# Patient Record
Sex: MALE | Race: WHITE | Employment: OTHER | URBAN - METROPOLITAN AREA
[De-identification: names, ages, dates, MRNs, and addresses within clinical notes are randomized per-mention and may not be internally consistent; named-entity substitution may affect disease eponyms.]

---

## 2017-05-02 ENCOUNTER — HOSPITAL ENCOUNTER (EMERGENCY)
Age: 62
Discharge: HOME | End: 2017-05-02
Attending: EMERGENCY MEDICINE
Payer: COMMERCIAL

## 2017-05-02 VITALS
RESPIRATION RATE: 20 BRPM | SYSTOLIC BLOOD PRESSURE: 139 MMHG | BODY MASS INDEX: 26.6 KG/M2 | HEART RATE: 75 BPM | HEIGHT: 71 IN | TEMPERATURE: 98 F | OXYGEN SATURATION: 100 % | DIASTOLIC BLOOD PRESSURE: 80 MMHG | WEIGHT: 190 LBS

## 2017-05-02 DIAGNOSIS — H60.501 ACUTE OTITIS EXTERNA OF RIGHT EAR, UNSPECIFIED TYPE: Primary | ICD-10-CM

## 2017-05-02 PROCEDURE — 99283 EMERGENCY DEPT VISIT LOW MDM: CPT

## 2017-05-02 RX ORDER — IBUPROFEN 600 MG/1
TABLET ORAL
Status: COMPLETED
Start: 2017-05-02 | End: 2017-05-02

## 2017-05-02 RX ORDER — IBUPROFEN 600 MG/1
600 TABLET ORAL ONCE
Status: COMPLETED | OUTPATIENT
Start: 2017-05-02 | End: 2017-05-02

## 2017-05-02 RX ORDER — ACETAMINOPHEN 500 MG
1000 TABLET ORAL ONCE
Status: DISCONTINUED | OUTPATIENT
Start: 2017-05-02 | End: 2017-05-02

## 2017-05-02 RX ORDER — AMOXICILLIN AND CLAVULANATE POTASSIUM 875; 125 MG/1; MG/1
1 TABLET, FILM COATED ORAL 2 TIMES DAILY
Qty: 14 TABLET | Refills: 0 | Status: SHIPPED | OUTPATIENT
Start: 2017-05-02 | End: 2017-05-09

## 2017-05-03 NOTE — ED PROVIDER NOTES
Patient Seen in: THE [facility] Emergency Department In [place]    History   Patient presents with:  Ear Problem Pain (neurosensory)    Stated Complaint: right ear pain     HPI    This is a very pleasant 20-year-old male with past medical history of hyperlipi TAKE ONE TABLET BY MOUTH NIGHTLY.   tamsulosin HCl 0.4 MG Oral Cap,  Take by mouth daily.        Family History   Problem Relation Age of Onset   • Cancer Father          Smoking Status: Current Every Day Smoker        Packs/Day: 1.00  Years:           Type Augmentin. He should be rechecked in 2 days by ENT or return here or his primary care physician. Ibuprofen and/or Norco for pain. If he develops fever, increased pain or drainage from his ear he should be rechecked immediately.   This was communicated to

## 2017-05-04 NOTE — ED NOTES
CALLED PT REGARDING [facility], PT WIFE ANSWERED AND STATES SHE IS TAKING PT TO ENT AT 0027 AND THE SWELLING HAS GOTTEN WORSE.   PT WIFE ENCOURAGED TO KEEP APPOINTMENT

## 2017-09-19 PROBLEM — M51.360 LUMBAR DISCOGENIC PAIN SYNDROME: Status: ACTIVE | Noted: 2017-09-19

## 2017-09-19 PROBLEM — M51.26 LUMBAR DISCOGENIC PAIN SYNDROME: Status: ACTIVE | Noted: 2017-09-19

## 2018-01-16 PROCEDURE — 87086 URINE CULTURE/COLONY COUNT: CPT | Performed by: EMERGENCY MEDICINE

## 2018-01-22 PROBLEM — E11.9 ENCOUNTER FOR DIABETIC FOOT EXAM (HCC): Status: ACTIVE | Noted: 2018-01-22

## 2018-01-22 PROBLEM — E11.9 DIABETIC EYE EXAM (HCC): Status: ACTIVE | Noted: 2018-01-22

## 2018-01-22 PROBLEM — Z01.00 DIABETIC EYE EXAM (HCC): Status: ACTIVE | Noted: 2018-01-22

## 2018-01-22 PROBLEM — K21.9 GASTROESOPHAGEAL REFLUX DISEASE WITHOUT ESOPHAGITIS: Status: ACTIVE | Noted: 2018-01-22

## 2018-01-22 PROBLEM — R73.01 IMPAIRED FASTING BLOOD SUGAR: Status: ACTIVE | Noted: 2018-01-22

## 2018-04-09 PROBLEM — N40.1 BENIGN PROSTATIC HYPERPLASIA WITH LOWER URINARY TRACT SYMPTOMS, SYMPTOM DETAILS UNSPECIFIED: Status: ACTIVE | Noted: 2018-04-09

## 2018-04-11 PROCEDURE — 82043 UR ALBUMIN QUANTITATIVE: CPT | Performed by: INTERNAL MEDICINE

## 2018-04-11 PROCEDURE — 36415 COLL VENOUS BLD VENIPUNCTURE: CPT | Performed by: INTERNAL MEDICINE

## 2018-04-11 PROCEDURE — 82570 ASSAY OF URINE CREATININE: CPT | Performed by: INTERNAL MEDICINE

## 2018-04-11 PROCEDURE — 81001 URINALYSIS AUTO W/SCOPE: CPT | Performed by: INTERNAL MEDICINE

## 2018-04-30 PROBLEM — R07.2 CHEST PAIN, PRECORDIAL: Status: ACTIVE | Noted: 2018-04-30

## 2018-04-30 PROBLEM — I71.2 ASCENDING AORTIC ANEURYSM (HCC): Status: ACTIVE | Noted: 2018-04-30

## 2018-04-30 PROBLEM — I71.21 ASCENDING AORTIC ANEURYSM (HCC): Status: ACTIVE | Noted: 2018-04-30

## 2018-04-30 PROBLEM — I71.2 ASCENDING AORTIC ANEURYSM: Status: ACTIVE | Noted: 2018-04-30

## 2018-04-30 PROBLEM — E78.00 PURE HYPERCHOLESTEROLEMIA: Status: ACTIVE | Noted: 2018-04-30

## 2018-04-30 PROBLEM — I71.21 ASCENDING AORTIC ANEURYSM: Status: ACTIVE | Noted: 2018-04-30

## 2018-05-03 PROBLEM — K64.9 HEMORRHOIDS, UNSPECIFIED HEMORRHOID TYPE: Status: ACTIVE | Noted: 2018-05-03

## 2018-05-03 PROBLEM — E11.65 TYPE 2 DIABETES MELLITUS WITH HYPERGLYCEMIA, UNSPECIFIED WHETHER LONG TERM INSULIN USE (HCC): Status: ACTIVE | Noted: 2018-05-03

## 2018-05-03 PROBLEM — I72.9 ANEURYSM (HCC): Status: ACTIVE | Noted: 2018-05-03

## 2018-05-03 PROBLEM — I72.9 ANEURYSM: Status: ACTIVE | Noted: 2018-05-03

## 2018-05-03 PROBLEM — J18.9 PNEUMONIA OF RIGHT LOWER LOBE DUE TO INFECTIOUS ORGANISM: Status: ACTIVE | Noted: 2018-05-03

## 2018-05-10 PROBLEM — R07.2 CHEST PAIN, PRECORDIAL: Status: RESOLVED | Noted: 2018-04-30 | Resolved: 2018-05-10

## 2018-05-10 PROBLEM — J18.9 PNEUMONIA OF RIGHT LOWER LOBE DUE TO INFECTIOUS ORGANISM: Status: RESOLVED | Noted: 2018-05-03 | Resolved: 2018-05-10

## 2018-05-30 PROBLEM — N39.0 ACUTE UTI: Status: ACTIVE | Noted: 2018-05-30

## 2018-05-30 PROBLEM — H93.91: Status: ACTIVE | Noted: 2018-05-30

## 2018-05-30 PROBLEM — R39.15 URINARY URGENCY: Status: ACTIVE | Noted: 2018-05-30

## 2018-05-30 PROBLEM — R35.0 URINARY FREQUENCY: Status: ACTIVE | Noted: 2018-05-30

## 2018-05-30 PROBLEM — H61.21 IMPACTED CERUMEN OF RIGHT EAR: Status: ACTIVE | Noted: 2018-05-30

## 2018-05-30 PROBLEM — R30.0 BURNING WITH URINATION: Status: ACTIVE | Noted: 2018-05-30

## 2018-12-17 PROBLEM — R35.0 URINARY FREQUENCY: Status: RESOLVED | Noted: 2018-05-30 | Resolved: 2018-12-17

## 2018-12-17 PROBLEM — E78.00 PURE HYPERCHOLESTEROLEMIA: Status: RESOLVED | Noted: 2018-04-30 | Resolved: 2018-12-17

## 2018-12-17 PROBLEM — R73.01 IMPAIRED FASTING BLOOD SUGAR: Status: RESOLVED | Noted: 2018-01-22 | Resolved: 2018-12-17

## 2018-12-17 PROBLEM — H61.21 IMPACTED CERUMEN OF RIGHT EAR: Status: RESOLVED | Noted: 2018-05-30 | Resolved: 2018-12-17

## 2018-12-17 PROBLEM — I72.9 ANEURYSM: Status: RESOLVED | Noted: 2018-05-03 | Resolved: 2018-12-17

## 2018-12-17 PROBLEM — E11.65 TYPE 2 DIABETES MELLITUS WITH HYPERGLYCEMIA, UNSPECIFIED WHETHER LONG TERM INSULIN USE (HCC): Status: RESOLVED | Noted: 2018-05-03 | Resolved: 2018-12-17

## 2018-12-17 PROBLEM — I72.9 ANEURYSM (HCC): Status: RESOLVED | Noted: 2018-05-03 | Resolved: 2018-12-17

## 2018-12-17 PROBLEM — N39.0 ACUTE UTI: Status: RESOLVED | Noted: 2018-05-30 | Resolved: 2018-12-17

## 2018-12-17 PROBLEM — R93.89 ABNORMAL CHEST X-RAY: Status: ACTIVE | Noted: 2018-12-17

## 2018-12-17 PROBLEM — R30.0 BURNING WITH URINATION: Status: RESOLVED | Noted: 2018-05-30 | Resolved: 2018-12-17

## 2018-12-17 PROBLEM — H93.91: Status: RESOLVED | Noted: 2018-05-30 | Resolved: 2018-12-17

## 2018-12-17 PROBLEM — R39.15 URINARY URGENCY: Status: RESOLVED | Noted: 2018-05-30 | Resolved: 2018-12-17

## 2019-01-03 PROCEDURE — 86256 FLUORESCENT ANTIBODY TITER: CPT | Performed by: NURSE PRACTITIONER

## 2019-01-03 PROCEDURE — 82784 ASSAY IGA/IGD/IGG/IGM EACH: CPT | Performed by: NURSE PRACTITIONER

## 2019-01-03 PROCEDURE — 36415 COLL VENOUS BLD VENIPUNCTURE: CPT | Performed by: NURSE PRACTITIONER

## 2019-01-03 PROCEDURE — 83516 IMMUNOASSAY NONANTIBODY: CPT | Performed by: NURSE PRACTITIONER

## 2019-01-17 PROBLEM — K64.9 HEMORRHOIDS, UNSPECIFIED HEMORRHOID TYPE: Status: RESOLVED | Noted: 2018-05-03 | Resolved: 2019-01-17

## 2019-01-17 PROBLEM — I71.2 THORACIC AORTIC ANEURYSM WITHOUT RUPTURE (HCC): Status: ACTIVE | Noted: 2019-01-17

## 2019-01-17 PROBLEM — I71.2 THORACIC AORTIC ANEURYSM WITHOUT RUPTURE: Status: ACTIVE | Noted: 2019-01-17

## 2019-01-17 PROBLEM — I71.20 THORACIC AORTIC ANEURYSM WITHOUT RUPTURE (HCC): Status: ACTIVE | Noted: 2019-01-17

## 2019-01-17 PROBLEM — M51.360 LUMBAR DISCOGENIC PAIN SYNDROME: Status: RESOLVED | Noted: 2017-09-19 | Resolved: 2019-01-17

## 2019-01-17 PROBLEM — R93.89 ABNORMAL CHEST X-RAY: Status: RESOLVED | Noted: 2018-12-17 | Resolved: 2019-01-17

## 2019-01-17 PROBLEM — I71.20 THORACIC AORTIC ANEURYSM WITHOUT RUPTURE: Status: ACTIVE | Noted: 2019-01-17

## 2019-01-17 PROBLEM — M51.26 LUMBAR DISCOGENIC PAIN SYNDROME: Status: RESOLVED | Noted: 2017-09-19 | Resolved: 2019-01-17

## 2019-02-11 PROCEDURE — 81001 URINALYSIS AUTO W/SCOPE: CPT | Performed by: INTERNAL MEDICINE

## 2019-02-11 PROCEDURE — 83883 ASSAY NEPHELOMETRY NOT SPEC: CPT | Performed by: INTERNAL MEDICINE

## 2019-02-11 PROCEDURE — 82570 ASSAY OF URINE CREATININE: CPT | Performed by: INTERNAL MEDICINE

## 2019-02-11 PROCEDURE — 84156 ASSAY OF PROTEIN URINE: CPT | Performed by: INTERNAL MEDICINE

## 2019-02-11 PROCEDURE — 86335 IMMUNFIX E-PHORSIS/URINE/CSF: CPT | Performed by: INTERNAL MEDICINE

## 2019-02-11 PROCEDURE — 82043 UR ALBUMIN QUANTITATIVE: CPT | Performed by: INTERNAL MEDICINE

## 2019-03-19 PROCEDURE — 88305 TISSUE EXAM BY PATHOLOGIST: CPT | Performed by: INTERNAL MEDICINE

## 2019-08-02 ENCOUNTER — OFFICE VISIT (OUTPATIENT)
Dept: FAMILY MEDICINE CLINIC | Facility: CLINIC | Age: 64
End: 2019-08-02
Payer: COMMERCIAL

## 2019-08-02 VITALS
SYSTOLIC BLOOD PRESSURE: 122 MMHG | OXYGEN SATURATION: 98 % | TEMPERATURE: 98 F | WEIGHT: 186 LBS | RESPIRATION RATE: 20 BRPM | HEART RATE: 66 BPM | DIASTOLIC BLOOD PRESSURE: 70 MMHG | BODY MASS INDEX: 26.04 KG/M2 | HEIGHT: 71 IN

## 2019-08-02 DIAGNOSIS — L25.8 CONTACT DERMATITIS DUE TO OTHER AGENT, UNSPECIFIED CONTACT DERMATITIS TYPE: Primary | ICD-10-CM

## 2019-08-02 PROCEDURE — 99201 OFFICE/OUTPT VISIT,NEW,LEVL I: CPT | Performed by: NURSE PRACTITIONER

## 2019-08-02 RX ORDER — BETAMETHASONE DIPROPIONATE 0.5 MG/G
1 CREAM TOPICAL 2 TIMES DAILY
Qty: 45 G | Status: CANCELLED | OUTPATIENT
Start: 2019-08-02 | End: 2019-08-09

## 2019-08-02 NOTE — PROGRESS NOTES
CHIEF COMPLAINT:   Patient presents with:  Lump: Bilateral arm pits bumps, itchy, X 4 days          HPI:    Katalina Warren is a 61year old male who presents for evaluation of a rash. Per patient rash started in the past 4 days.  Rash has been worsening management contract agreement 12/14/2016      Past Surgical History:   Procedure Laterality Date   • CHOLECYSTECTOMY     • COLONOSCOPY  02/26/2018    Aden Khan   • NEPHRECTOMY Left 2015   • OTHER SURGICAL HISTORY      green light laser procedure cream at home (betamethasone) that was used in the past and does not need a refill. Advised to use same cream. Meds as listed below. Comfort measures as described in Patient Instructions. Skin care discussed with patient.      Meds & Refills for this Visi

## 2019-08-02 NOTE — PATIENT INSTRUCTIONS
-can try over the counter allergy medication such as claritin or zyrtec  -apply steroid cream twice daily for up to 1 week  -follow up if new or worsening symptoms over next 3-4 days  -watch for signs and symptoms of infection such as fever, worsening in r leathery  · Swollen  · Warm  The blisters may ooze fluid and form crusts. Treatment for contact dermatitis  Treatment is done to help relieve itching and reduce inflammation. The rash should go away in a few days to a few weeks.  Treatments include:  ·  follow your healthcare professional's instructions.

## 2020-11-28 ENCOUNTER — APPOINTMENT (OUTPATIENT)
Dept: CT IMAGING | Age: 65
End: 2020-11-28
Attending: EMERGENCY MEDICINE
Payer: MEDICARE

## 2020-11-28 ENCOUNTER — HOSPITAL ENCOUNTER (EMERGENCY)
Age: 65
Discharge: HOME | End: 2020-11-28
Attending: EMERGENCY MEDICINE
Payer: MEDICARE

## 2020-11-28 VITALS
RESPIRATION RATE: 18 BRPM | WEIGHT: 182 LBS | HEIGHT: 71 IN | BODY MASS INDEX: 25.48 KG/M2 | HEART RATE: 61 BPM | OXYGEN SATURATION: 99 % | SYSTOLIC BLOOD PRESSURE: 123 MMHG | DIASTOLIC BLOOD PRESSURE: 68 MMHG | TEMPERATURE: 98 F

## 2020-11-28 DIAGNOSIS — R10.9 ABDOMINAL PAIN OF UNKNOWN ETIOLOGY: Primary | ICD-10-CM

## 2020-11-28 PROCEDURE — 74176 CT ABD & PELVIS W/O CONTRAST: CPT | Performed by: EMERGENCY MEDICINE

## 2020-11-28 PROCEDURE — 99284 EMERGENCY DEPT VISIT MOD MDM: CPT

## 2020-11-28 PROCEDURE — 81003 URINALYSIS AUTO W/O SCOPE: CPT | Performed by: EMERGENCY MEDICINE

## 2020-11-28 PROCEDURE — 36415 COLL VENOUS BLD VENIPUNCTURE: CPT

## 2020-11-28 PROCEDURE — 83690 ASSAY OF LIPASE: CPT | Performed by: EMERGENCY MEDICINE

## 2020-11-28 PROCEDURE — 85025 COMPLETE CBC W/AUTO DIFF WBC: CPT | Performed by: EMERGENCY MEDICINE

## 2020-11-28 PROCEDURE — 80053 COMPREHEN METABOLIC PANEL: CPT | Performed by: EMERGENCY MEDICINE

## 2020-11-28 RX ORDER — DICYCLOMINE HCL 20 MG
20 TABLET ORAL 4 TIMES DAILY PRN
Qty: 30 TABLET | Refills: 0 | Status: SHIPPED | OUTPATIENT
Start: 2020-11-28 | End: 2020-12-07 | Stop reason: ALTCHOICE

## 2020-11-28 NOTE — ED INITIAL ASSESSMENT (HPI)
Pt c/o abdominal pain and mid left back pain that started 4 days agio. Pt states he had mild constipation with the pain but had a normal bowel movement today after taking stool softeners, denies diarrhea, c/o nausea denies vomiting.  Pt had left kidney deepak

## 2020-11-28 NOTE — ED PROVIDER NOTES
Patient Seen in: THE [facility] Emergency Department In [place]      History   Patient presents with:  Abdomen/Flank Pain    Stated Complaint: abdomen and back pain 4 days nausea  hx of diverticulitis     HPI    Mid upper abdominal pain rating to the back for Triage Vitals [11/28/20 1202]   /75   Pulse 66   Resp 18   Temp 98.1 °F (36.7 °C)   Temp src    SpO2 100 %   O2 Device None (Room air)       Current:/68   Pulse 61   Temp 98.1 °F (36.7 °C)   Resp 18   Ht 180.3 cm (5' 11\")   Wt 82.6 kg   SpO2 9 DRAW LAVENDER   RAINBOW DRAW LIGHT GREEN   RAINBOW DRAW GOLD   CBC W/ DIFFERENTIAL          Blood work reviewed, reassuring. I have personally visualized the Radiology studies and agree with interpretation from radiology.     Ct Abdomen+pelvis(cpt=74176) visible pulmonary or pleural disease. OTHER:  Negative. CONCLUSION:  1. Specific etiology for abdominal pain is not evident. 2. There is uncomplicated diverticulosis.  3. 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

## 2020-12-07 ENCOUNTER — OFFICE VISIT (OUTPATIENT)
Dept: FAMILY MEDICINE CLINIC | Facility: CLINIC | Age: 65
End: 2020-12-07
Payer: MEDICARE

## 2020-12-07 VITALS
DIASTOLIC BLOOD PRESSURE: 78 MMHG | HEIGHT: 70.5 IN | WEIGHT: 182 LBS | OXYGEN SATURATION: 98 % | BODY MASS INDEX: 25.76 KG/M2 | SYSTOLIC BLOOD PRESSURE: 114 MMHG | RESPIRATION RATE: 15 BRPM | TEMPERATURE: 98 F | HEART RATE: 81 BPM

## 2020-12-07 DIAGNOSIS — E78.2 MIXED HYPERLIPIDEMIA: ICD-10-CM

## 2020-12-07 DIAGNOSIS — Z00.00 LABORATORY EXAMINATION ORDERED AS PART OF A ROUTINE GENERAL MEDICAL EXAMINATION: ICD-10-CM

## 2020-12-07 DIAGNOSIS — Z12.5 SCREENING FOR PROSTATE CANCER: ICD-10-CM

## 2020-12-07 DIAGNOSIS — B02.9 HERPES ZOSTER WITHOUT COMPLICATION: Primary | ICD-10-CM

## 2020-12-07 DIAGNOSIS — I71.2 THORACIC AORTIC ANEURYSM WITHOUT RUPTURE (HCC): ICD-10-CM

## 2020-12-07 DIAGNOSIS — R10.12 LUQ ABDOMINAL PAIN: ICD-10-CM

## 2020-12-07 PROCEDURE — 99204 OFFICE O/P NEW MOD 45 MIN: CPT | Performed by: EMERGENCY MEDICINE

## 2020-12-07 RX ORDER — VALACYCLOVIR HYDROCHLORIDE 1 G/1
1 TABLET, FILM COATED ORAL 3 TIMES DAILY
Qty: 21 TABLET | Refills: 0 | Status: SHIPPED | OUTPATIENT
Start: 2020-12-07 | End: 2020-12-11

## 2020-12-07 NOTE — PATIENT INSTRUCTIONS
Thank you for choosing [address] [place] [address] Group  To Do:  [facility]        1. Follow up for [address] in 1-2 weeks  2. Start Valtrex for shingles  3. Keep area protected and covered  4.  Have blood tests done                        Shingl soda may help relieve pain and itching.   · Gently wash skin daily with soap and water to help prevent infection. Be certain to rinse off all of the soap, which can be irritating. · Trim fingernails and try not to scratch.  Scratching the sores may leave s

## 2020-12-07 NOTE — PROGRESS NOTES
Chief Complaint:   Patient presents with:  Abdominal Pain: NP    HPI:   This is a 72year old male     ESTABLISHMENT OF [facility]    ER FOLLOW UP  Seen in ER 1 week ago because of pain in LUQ that radiated to the back. CT scan and w/u all negative.  Still with RASH  Current Meds:    •  HYDROcodone-acetaminophen  MG Oral Tab, Take 1 tablet by mouth every 6 (six) hours as needed for Pain.  30 days, Disp: 90 tablet, Rfl: 0    •  gabapentin 400 MG Oral Cap, Take 1 capsule (400 mg total) by mouth ever well groomed.   GENERAL: well developed, well nourished, well hydrated, no distress  SKIN: good skin turgor, no obvious rashes  HEENT: atraumatic, normocephalic, ears, nose and throat are clear  EYES: sclera non icteric bilateral  NECK: supple, no adenopath Collection Time: 11/28/20 12:20 PM   Result Value Ref Range    Lipase 72 (L) 73 - 393 U/L   CBC W/ DIFFERENTIAL    Collection Time: 11/28/20 12:20 PM   Result Value Ref Range    WBC 7.5 4.0 - 11.0 x10(3) uL    RBC 4.80 3.80 - 5.80 x10(6)uL    HGB 15.3 13. 0 ([facility] [address]  Closely followed by CV surgery  On statin  BP stable    5. Mixed hyperlipidemia  On statin    6. Laboratory examination ordered as part of a routine general medical examination  - CBC WITH DIFFERENTIAL WITH PLATELET; Future  - LIPID PANEL;  Future  -

## 2020-12-11 DIAGNOSIS — B02.9 HERPES ZOSTER WITHOUT COMPLICATION: ICD-10-CM

## 2020-12-11 RX ORDER — VALACYCLOVIR HYDROCHLORIDE 1 G/1
TABLET, FILM COATED ORAL
Qty: 21 TABLET | Refills: 0 | Status: SHIPPED | OUTPATIENT
Start: 2020-12-11

## 2020-12-14 ENCOUNTER — LAB ENCOUNTER (OUTPATIENT)
Dept: LAB | Age: 65
End: 2020-12-14
Attending: EMERGENCY MEDICINE
Payer: MEDICARE

## 2020-12-14 DIAGNOSIS — Z12.5 SCREENING FOR PROSTATE CANCER: ICD-10-CM

## 2020-12-14 DIAGNOSIS — Z00.00 LABORATORY EXAMINATION ORDERED AS PART OF A ROUTINE GENERAL MEDICAL EXAMINATION: ICD-10-CM

## 2020-12-14 DIAGNOSIS — I71.2 THORACIC AORTIC ANEURYSM WITHOUT RUPTURE (HCC): ICD-10-CM

## 2020-12-14 DIAGNOSIS — E78.2 MIXED HYPERLIPIDEMIA: ICD-10-CM

## 2020-12-14 PROCEDURE — 36415 COLL VENOUS BLD VENIPUNCTURE: CPT

## 2020-12-14 PROCEDURE — 85025 COMPLETE CBC W/AUTO DIFF WBC: CPT

## 2020-12-14 PROCEDURE — 84443 ASSAY THYROID STIM HORMONE: CPT

## 2020-12-14 PROCEDURE — 80061 LIPID PANEL: CPT

## 2020-12-21 ENCOUNTER — OFFICE VISIT (OUTPATIENT)
Dept: FAMILY MEDICINE CLINIC | Facility: CLINIC | Age: 65
End: 2020-12-21
Payer: MEDICARE

## 2020-12-21 VITALS
TEMPERATURE: 98 F | HEART RATE: 71 BPM | WEIGHT: 185 LBS | HEIGHT: 70.5 IN | OXYGEN SATURATION: 100 % | BODY MASS INDEX: 26.19 KG/M2 | RESPIRATION RATE: 17 BRPM | SYSTOLIC BLOOD PRESSURE: 122 MMHG | DIASTOLIC BLOOD PRESSURE: 78 MMHG

## 2020-12-21 DIAGNOSIS — I71.2 ASCENDING AORTIC ANEURYSM (HCC): ICD-10-CM

## 2020-12-21 DIAGNOSIS — K21.9 CHRONIC GERD: ICD-10-CM

## 2020-12-21 DIAGNOSIS — Z00.00 WELCOME TO MEDICARE PREVENTIVE VISIT: Primary | ICD-10-CM

## 2020-12-21 DIAGNOSIS — E78.00 HYPERCHOLESTEROLEMIA: ICD-10-CM

## 2020-12-21 DIAGNOSIS — F17.200 TOBACCO USE DISORDER: ICD-10-CM

## 2020-12-21 DIAGNOSIS — Z23 NEED FOR PNEUMOCOCCAL VACCINATION: ICD-10-CM

## 2020-12-21 DIAGNOSIS — Z00.00 ENCOUNTER FOR ANNUAL HEALTH EXAMINATION: ICD-10-CM

## 2020-12-21 PROBLEM — E11.9 DIABETIC EYE EXAM (HCC): Status: RESOLVED | Noted: 2018-01-22 | Resolved: 2020-12-21

## 2020-12-21 PROBLEM — Z90.5 HISTORY OF UNILATERAL NEPHRECTOMY: Status: ACTIVE | Noted: 2020-12-21

## 2020-12-21 PROBLEM — Z01.00 DIABETIC EYE EXAM (HCC): Status: RESOLVED | Noted: 2018-01-22 | Resolved: 2020-12-21

## 2020-12-21 PROCEDURE — G0009 ADMIN PNEUMOCOCCAL VACCINE: HCPCS | Performed by: EMERGENCY MEDICINE

## 2020-12-21 PROCEDURE — G0402 INITIAL PREVENTIVE EXAM: HCPCS | Performed by: EMERGENCY MEDICINE

## 2020-12-21 PROCEDURE — G0403 EKG FOR INITIAL PREVENT EXAM: HCPCS | Performed by: EMERGENCY MEDICINE

## 2020-12-21 PROCEDURE — 90670 PCV13 VACCINE IM: CPT | Performed by: EMERGENCY MEDICINE

## 2020-12-21 PROCEDURE — 99406 BEHAV CHNG SMOKING 3-10 MIN: CPT | Performed by: EMERGENCY MEDICINE

## 2020-12-21 RX ORDER — BUPROPION HYDROCHLORIDE 150 MG/1
150 TABLET, EXTENDED RELEASE ORAL 2 TIMES DAILY
Qty: 60 TABLET | Refills: 1 | Status: SHIPPED | OUTPATIENT
Start: 2020-12-21 | End: 2021-02-17

## 2020-12-21 NOTE — PROGRESS NOTES
HPI:   Agustín Del Rio is a 72year old male who presents for a Medicare Initial Preventative Physical Exam (Welcome to Medicare- < 12 months on Medicare).       His last annual assessment has been over 1 year: Annual Physical due on 12/21/2021        IDA this link)          CAGE Alcohol screening   Leydi Trevino was screened for Alcohol abuse and had a score of 0 so is at low risk.      Patient Care Team: Patient Care Team:  Kasey Garcia MD as PCP - General (Family Medicine)  Elpidio Potts MD as Maria Elena Harrington Tablet 12 Hr, Take 1 tablet (150 mg total) by mouth 2 (two) times daily.  Start with 1 tablet daily for 3 -4 d, then 1 tab twice a day    •  VALACYCLOVIR HCL 1 G Oral Tab, TAKE ONE TABLET BY MOUTH THREE TIMES A DAY FOR SEVEN DAYS    •  predniSONE 20 MG Oral exertion  GI: denies abdominal pain, denies heartburn  :  no complaint of urinary incontinence  MUSCULOSKELETAL: denies back pain  NEURO: denies headaches  PSYCHE: denies depression or anxiety  HEMATOLOGIC: denies hx of anemia  ENDOCRINE: denies thyroid atraumatic, no cyanosis or edema   Pulses: 2+ and symmetric   Skin: Skin color, texture, turgor normal, no rashes or lesions   Lymph nodes: Cervical, supraclavicular, and axillary nodes normal   Neurologic: Normal            Vaccination History     Adele Mcguire follow-ups on file.      Manju Santos MD, 12/21/2020     General Health     In the past six months, have you lost more than 10 pounds without trying?: 2 - No  Has your appetite been poor?: No  How does the patient maintain a good energy level?: Daily W Activity if applicable)     Influenza  Covered Annually    Please get every year    Pneumococcal 13 (Prevnar)  Covered Once after 65 No vaccine history found Please get once after your 65th birthday    Pneumococcal 23 (Pneumovax)  Covered Once after 65 No 137 (H)     LDL Cholesterol (mg/dL)   Date Value   12/14/2020 92     Calculated LDL (mg/dL)   Date Value   02/11/2019 141 (H)    No flowsheet data found. Dilated Eye exam  Annually No flowsheet data found. No flowsheet data found.

## 2020-12-21 NOTE — PATIENT INSTRUCTIONS
Thank you for choosing [address] [place] [address] Group  To Do:  [facility]        1. Arrange for repeat upper endoscopy, follow up with gastroenterology, Dr. Nela Lambert  2. contoinue with rosuvastatin for cholesterol  3.  Continue follow up with pain specialist Cholesterol (mg/dL)   Date Value   02/11/2019 223 (H)     Triglycerides (mg/dL)   Date Value   12/14/2020 107   02/11/2019 209 (H)        EKG - covered if needed at Welcome to Medicare, and non-screening if indicated for medical reasons    Electrocardi previous visit. Please get once after your 65th birthday    Pneumococcal 23 (Pneumovax)  Covered Once after 65 No orders found for this or any previous visit.  Please get once after your 65th birthday    Hepatitis B for Moderate/High Risk No orders found fo

## 2020-12-28 ENCOUNTER — TELEPHONE (OUTPATIENT)
Dept: FAMILY MEDICINE CLINIC | Facility: CLINIC | Age: 65
End: 2020-12-28

## 2020-12-28 NOTE — TELEPHONE ENCOUNTER
----- Message from Evette Payne MD sent at 12/26/2020 11:36 PM CST -----  All labs stable  Continue statin therapy  Follow up as directed    Result letter mailed to pt

## 2020-12-28 NOTE — TELEPHONE ENCOUNTER
Patient was started on buPROPion HCl ER, SR, 150 MG Oral Tablet 12 Hr and since then has trouble with sleeping, he is up until 2-3am.

## 2020-12-29 NOTE — TELEPHONE ENCOUNTER
Given bupropion HCl  mg 12-hour for smoking cessation. Unfortunately insomnia is a side effect of medication. He should discontinue  Dr. Mazariegos Number is already provided smoking cessation.     Bupropion helps with withdrawal  Would recommend patient jamison

## 2020-12-29 NOTE — TELEPHONE ENCOUNTER
Called pt's wife (on HIPAA) and informed of below. Questions answered. Wife verbalized understanding.

## 2021-02-06 DIAGNOSIS — Z23 NEED FOR VACCINATION: ICD-10-CM

## 2021-02-17 DIAGNOSIS — F17.200 TOBACCO USE DISORDER: ICD-10-CM

## 2021-02-17 RX ORDER — BUPROPION HYDROCHLORIDE 150 MG/1
TABLET, EXTENDED RELEASE ORAL
Qty: 60 TABLET | Refills: 0 | Status: SHIPPED | OUTPATIENT
Start: 2021-02-17 | End: 2021-03-16

## 2021-02-22 NOTE — PROGRESS NOTES
Chief Complaint:   Patient presents with:  Smoking: F/u smoking cessation     HPI:   This is a 72year old male       SMOKING CESSATION  Started on wellbutrin. Has cut down significantly. No side effects. No palpitations.  Now smoking only 5 cigarettes a HYDROcodone-acetaminophen  MG Oral Tab, Take 1 tablet by mouth every 6 (six) hours as needed for Pain.  30 days, Disp: 90 tablet, Rfl: 0    •  VALACYCLOVIR HCL 1 G Oral Tab, TAKE ONE TABLET BY MOUTH THREE TIMES A DAY FOR SEVEN DAYS, Disp: 21 tablet, R good skin turgor, no obvious rashes      No results found for this or any previous visit (from the past 672 hour(s)). ASSESSMENT AND PLAN:         1. Tobacco use disorder  Doing well  Ok to continue with wellbutrin    2.  Abdominal pain, unspecified

## 2021-02-22 NOTE — PATIENT INSTRUCTIONS
Thank you for choosing [facility]  To Do:  [address] [facility]        1. contoinue with wellbutrin for smoking cessation  2. Follow up in 3 months  3.  Continue with weaning from smoking        Kicking the Smoking Habit   If you smoke, it doubles y saving money. Keep this list and review it whenever you feel like smoking. · Get support. Let your friends know you may call them to talk when you have an urge to smoke.   · If you’ve tried to quit before without success, this time stay away from the lynn

## 2021-03-15 DIAGNOSIS — F17.200 TOBACCO USE DISORDER: ICD-10-CM

## 2021-03-15 NOTE — TELEPHONE ENCOUNTER
Medication(s) to Refill:   Requested Prescriptions     Pending Prescriptions Disp Refills   • BUPROPION HCL ER, SR, 150 MG Oral Tablet 12 Hr [Pharmacy Med Name: buPROPion HCL  MG TABLET] 60 tablet 0     Sig: TAKE 1 TABLET BY MOUTH DAILY FOR THE FIRST

## 2021-03-16 RX ORDER — BUPROPION HYDROCHLORIDE 150 MG/1
150 TABLET, EXTENDED RELEASE ORAL 2 TIMES DAILY
Qty: 60 TABLET | Refills: 1 | Status: SHIPPED | OUTPATIENT
Start: 2021-03-16 | End: 2021-05-17

## 2021-05-16 DIAGNOSIS — F17.200 TOBACCO USE DISORDER: ICD-10-CM

## 2021-05-17 RX ORDER — BUPROPION HYDROCHLORIDE 150 MG/1
TABLET, EXTENDED RELEASE ORAL
Qty: 60 TABLET | Refills: 0 | Status: SHIPPED | OUTPATIENT
Start: 2021-05-17 | End: 2021-05-24

## 2021-05-17 NOTE — TELEPHONE ENCOUNTER
Medication(s) to Refill:   Requested Prescriptions     Pending Prescriptions Disp Refills   • BUPROPION HCL ER, SR, 150 MG Oral Tablet 12 Hr [Pharmacy Med Name: buPROPion HCL  MG TABLET] 60 tablet 0     Sig: TAKE ONE TABLET BY MOUTH TWICE A DAY

## 2021-05-24 ENCOUNTER — MED REC SCAN ONLY (OUTPATIENT)
Dept: FAMILY MEDICINE CLINIC | Facility: CLINIC | Age: 66
End: 2021-05-24

## 2021-05-24 NOTE — PROGRESS NOTES
Chief Complaint:   Patient presents with:  Smoking: Med f/u     HPI:   This is a 72year old male         SMOKING  Recently started wellbutrin. Has significantly slowed down smoking. Only smoking 1-2 cig every other day. Less \"crabby\"  More patient.  A RASH  Current Meds:  HYDROcodone-acetaminophen  MG Oral Tab, Take 1 tablet by mouth every 6 (six) hours as needed for Pain.  30 days, Disp: 90 tablet, Rfl: 0  Pantoprazole Sodium 40 MG Oral Tab EC, Take 1 tablet (40 mg total) by mouth 2 (two) skin turgor, no obvious rashes  HEENT: atraumatic, normocephalic, ears, nose and throat are clear  EYES: sclera non icteric bilateral  NECK: supple, no adenopathy, no thyromegaly  LUNGS: clear to auscultation, no RRW  CARDIO: RRR without murmur  EXTREMITIE

## 2021-05-24 NOTE — PATIENT INSTRUCTIONS
Thank you for choosing [address] [place] [address] Group  To Do:  [facility]        1. Monitor BP closely,  2. Decrease wellbutrin to 75 mg daily  3. Follow up in 1 month for recheck  4.  Continue with smoking cessation                        Kicking the Smoking quitting such as reducing heart risks and saving money. Keep this list and review it whenever you feel like smoking. · Get support. Let your friends know you may call them to talk when you have an urge to smoke.   · If you’ve tried to quit before without s

## 2021-06-19 DIAGNOSIS — F17.200 TOBACCO USE DISORDER: ICD-10-CM

## 2021-06-21 RX ORDER — BUPROPION HYDROCHLORIDE 75 MG/1
TABLET ORAL
Qty: 60 TABLET | Refills: 0 | Status: SHIPPED | OUTPATIENT
Start: 2021-06-21 | End: 2021-07-22

## 2021-06-21 NOTE — TELEPHONE ENCOUNTER
Medication(s) to Refill:   Requested Prescriptions     Pending Prescriptions Disp Refills   • BUPROPION HCL 75 MG Oral Tab [Pharmacy Med Name: buPROPion HCL 75 MG TABLET] 60 tablet 0     Sig: TAKE ONE TABLET BY MOUTH TWICE A DAY         Reason for [address] [facility] [address]

## 2021-07-21 DIAGNOSIS — F17.200 TOBACCO USE DISORDER: ICD-10-CM

## 2021-07-22 RX ORDER — BUPROPION HYDROCHLORIDE 75 MG/1
TABLET ORAL
Qty: 60 TABLET | Refills: 0 | Status: SHIPPED | OUTPATIENT
Start: 2021-07-22 | End: 2021-08-19

## 2021-08-19 DIAGNOSIS — F17.200 TOBACCO USE DISORDER: ICD-10-CM

## 2021-08-19 NOTE — TELEPHONE ENCOUNTER
Medication(s) to Refill:   Requested Prescriptions     Pending Prescriptions Disp Refills   • buPROPion HCl 75 MG Oral Tab 60 tablet 0     Sig: Take 1 tablet (75 mg total) by mouth 2 (two) times daily.          Reason for Medication Refill being sent to [facility]

## 2021-08-20 RX ORDER — BUPROPION HYDROCHLORIDE 75 MG/1
75 TABLET ORAL 2 TIMES DAILY
Qty: 60 TABLET | Refills: 0 | Status: SHIPPED | OUTPATIENT
Start: 2021-08-20 | End: 2021-09-20

## 2021-09-20 DIAGNOSIS — F17.200 TOBACCO USE DISORDER: ICD-10-CM

## 2021-09-20 NOTE — TELEPHONE ENCOUNTER
Medication(s) to Refill:   Requested Prescriptions     Pending Prescriptions Disp Refills   • buPROPion 75 MG Oral Tab 180 tablet 0     Sig: Take 1 tablet (75 mg total) by mouth 2 (two) times daily.          Reason for Medication Refill being sent to [facility]

## 2021-09-22 RX ORDER — BUPROPION HYDROCHLORIDE 75 MG/1
75 TABLET ORAL 2 TIMES DAILY
Qty: 180 TABLET | Refills: 0 | Status: SHIPPED | OUTPATIENT
Start: 2021-09-22

## 2022-02-23 ENCOUNTER — OFFICE VISIT (OUTPATIENT)
Dept: FAMILY MEDICINE CLINIC | Facility: CLINIC | Age: 67
End: 2022-02-23
Payer: MEDICARE

## 2022-02-23 VITALS
HEIGHT: 70 IN | BODY MASS INDEX: 28.92 KG/M2 | OXYGEN SATURATION: 98 % | SYSTOLIC BLOOD PRESSURE: 120 MMHG | WEIGHT: 202 LBS | HEART RATE: 68 BPM | DIASTOLIC BLOOD PRESSURE: 70 MMHG | RESPIRATION RATE: 16 BRPM

## 2022-02-23 DIAGNOSIS — Z23 NEED FOR PNEUMOCOCCAL VACCINATION: ICD-10-CM

## 2022-02-23 DIAGNOSIS — K21.9 GASTROESOPHAGEAL REFLUX DISEASE WITHOUT ESOPHAGITIS: Primary | ICD-10-CM

## 2022-02-23 PROCEDURE — 99214 OFFICE O/P EST MOD 30 MIN: CPT | Performed by: NURSE PRACTITIONER

## 2022-02-23 PROCEDURE — G0009 ADMIN PNEUMOCOCCAL VACCINE: HCPCS | Performed by: NURSE PRACTITIONER

## 2022-02-23 PROCEDURE — 90732 PPSV23 VACC 2 YRS+ SUBQ/IM: CPT | Performed by: NURSE PRACTITIONER

## 2022-02-23 RX ORDER — PANTOPRAZOLE SODIUM 40 MG/1
TABLET, DELAYED RELEASE ORAL
COMMUNITY
Start: 2022-01-13 | End: 2022-02-23

## 2022-02-23 RX ORDER — PANTOPRAZOLE SODIUM 40 MG/1
40 TABLET, DELAYED RELEASE ORAL
Qty: 180 TABLET | Refills: 3 | Status: SHIPPED | OUTPATIENT
Start: 2022-02-23

## 2022-03-07 ENCOUNTER — TELEPHONE (OUTPATIENT)
Dept: FAMILY MEDICINE CLINIC | Facility: CLINIC | Age: 67
End: 2022-03-07

## 2022-03-07 NOTE — TELEPHONE ENCOUNTER
Pt's wife called and is asking if pt should do at home stool test or does pt need a colonoscopy. Please advise. Thank you.    LOV: 02/23/22

## 2022-03-08 NOTE — TELEPHONE ENCOUNTER
Notified the patient's wife of the response by the provider. Patient's wife verbalized understanding. Patient's wife states that she will have the patient complete a colonoscopy with the same provider that she is sees. (Does not need referral from PCP.) Answered all questions at this time.

## 2022-08-15 ENCOUNTER — OFFICE VISIT (OUTPATIENT)
Dept: FAMILY MEDICINE CLINIC | Facility: CLINIC | Age: 67
End: 2022-08-15
Payer: MEDICARE

## 2022-08-15 VITALS
DIASTOLIC BLOOD PRESSURE: 68 MMHG | SYSTOLIC BLOOD PRESSURE: 124 MMHG | HEIGHT: 71 IN | WEIGHT: 193.5 LBS | RESPIRATION RATE: 15 BRPM | HEART RATE: 53 BPM | BODY MASS INDEX: 27.09 KG/M2 | OXYGEN SATURATION: 98 %

## 2022-08-15 DIAGNOSIS — G25.81 RESTLESS LEGS SYNDROME (RLS): ICD-10-CM

## 2022-08-15 DIAGNOSIS — K21.9 CHRONIC GERD: ICD-10-CM

## 2022-08-15 DIAGNOSIS — I73.9 CLAUDICATION (HCC): ICD-10-CM

## 2022-08-15 DIAGNOSIS — R79.89 OTHER SPECIFIED ABNORMAL FINDINGS OF BLOOD CHEMISTRY: ICD-10-CM

## 2022-08-15 DIAGNOSIS — M54.9 CHRONIC BACK PAIN, UNSPECIFIED BACK LOCATION, UNSPECIFIED BACK PAIN LATERALITY: ICD-10-CM

## 2022-08-15 DIAGNOSIS — F17.200 TOBACCO USE DISORDER: ICD-10-CM

## 2022-08-15 DIAGNOSIS — Z11.59 NEED FOR HEPATITIS C SCREENING TEST: ICD-10-CM

## 2022-08-15 DIAGNOSIS — Z00.00 ENCOUNTER FOR ANNUAL HEALTH EXAMINATION: Primary | ICD-10-CM

## 2022-08-15 DIAGNOSIS — M79.605 BILATERAL LEG PAIN: ICD-10-CM

## 2022-08-15 DIAGNOSIS — M79.604 BILATERAL LEG PAIN: ICD-10-CM

## 2022-08-15 DIAGNOSIS — I71.2 THORACIC AORTIC ANEURYSM WITHOUT RUPTURE (HCC): ICD-10-CM

## 2022-08-15 DIAGNOSIS — Z12.5 PROSTATE CANCER SCREENING: ICD-10-CM

## 2022-08-15 DIAGNOSIS — K63.3 PROXIMAL COLON ULCER: ICD-10-CM

## 2022-08-15 DIAGNOSIS — E78.00 HYPERCHOLESTEROLEMIA: ICD-10-CM

## 2022-08-15 DIAGNOSIS — G89.29 CHRONIC BACK PAIN, UNSPECIFIED BACK LOCATION, UNSPECIFIED BACK PAIN LATERALITY: ICD-10-CM

## 2022-08-15 PROBLEM — I71.9 AORTIC ANEURYSM OF UNSPECIFIED SITE, WITHOUT RUPTURE (HCC): Status: ACTIVE | Noted: 2018-05-04

## 2022-08-15 PROBLEM — I71.9 AORTIC ANEURYSM OF UNSPECIFIED SITE, WITHOUT RUPTURE: Status: ACTIVE | Noted: 2018-05-04

## 2022-08-15 PROBLEM — I82.401 ACUTE EMBOLISM AND THROMBOSIS OF UNSPECIFIED DEEP VEINS OF RIGHT LOWER EXTREMITY (HCC): Status: ACTIVE | Noted: 2018-05-04

## 2022-08-15 PROBLEM — N17.9 ACUTE KIDNEY FAILURE, UNSPECIFIED (HCC): Status: ACTIVE | Noted: 2018-05-04

## 2022-08-15 PROBLEM — N17.9 ACUTE KIDNEY FAILURE, UNSPECIFIED: Status: ACTIVE | Noted: 2018-05-04

## 2022-08-15 PROBLEM — M47.812 FACET ARTHRITIS OF CERVICAL REGION: Status: ACTIVE | Noted: 2022-08-15

## 2022-08-15 PROCEDURE — 99406 BEHAV CHNG SMOKING 3-10 MIN: CPT | Performed by: EMERGENCY MEDICINE

## 2022-08-15 PROCEDURE — G0439 PPPS, SUBSEQ VISIT: HCPCS | Performed by: EMERGENCY MEDICINE

## 2022-08-15 PROCEDURE — 1125F AMNT PAIN NOTED PAIN PRSNT: CPT | Performed by: EMERGENCY MEDICINE

## 2022-08-15 PROCEDURE — 99213 OFFICE O/P EST LOW 20 MIN: CPT | Performed by: EMERGENCY MEDICINE

## 2022-08-15 RX ORDER — PANTOPRAZOLE SODIUM 40 MG/1
40 TABLET, DELAYED RELEASE ORAL DAILY
Qty: 30 TABLET | Refills: 0 | Status: SHIPPED | OUTPATIENT
Start: 2022-08-15

## 2022-08-15 RX ORDER — BUPROPION HYDROCHLORIDE 150 MG/1
150 TABLET, EXTENDED RELEASE ORAL 2 TIMES DAILY
Qty: 60 TABLET | Refills: 2 | Status: SHIPPED | OUTPATIENT
Start: 2022-08-15

## 2022-08-17 ENCOUNTER — LAB ENCOUNTER (OUTPATIENT)
Dept: LAB | Age: 67
End: 2022-08-17
Attending: EMERGENCY MEDICINE
Payer: MEDICARE

## 2022-08-17 DIAGNOSIS — R79.89 OTHER SPECIFIED ABNORMAL FINDINGS OF BLOOD CHEMISTRY: ICD-10-CM

## 2022-08-17 DIAGNOSIS — M54.9 CHRONIC BACK PAIN, UNSPECIFIED BACK LOCATION, UNSPECIFIED BACK PAIN LATERALITY: ICD-10-CM

## 2022-08-17 DIAGNOSIS — K63.3 PROXIMAL COLON ULCER: ICD-10-CM

## 2022-08-17 DIAGNOSIS — Z11.59 NEED FOR HEPATITIS C SCREENING TEST: ICD-10-CM

## 2022-08-17 DIAGNOSIS — G89.29 CHRONIC BACK PAIN, UNSPECIFIED BACK LOCATION, UNSPECIFIED BACK PAIN LATERALITY: ICD-10-CM

## 2022-08-17 DIAGNOSIS — Z00.00 ENCOUNTER FOR ANNUAL HEALTH EXAMINATION: ICD-10-CM

## 2022-08-17 DIAGNOSIS — Z12.5 PROSTATE CANCER SCREENING: ICD-10-CM

## 2022-08-17 DIAGNOSIS — E78.00 HYPERCHOLESTEROLEMIA: ICD-10-CM

## 2022-08-17 LAB
ALBUMIN SERPL-MCNC: 4.3 G/DL (ref 3.4–5)
ALBUMIN/GLOB SERPL: 1.2 {RATIO} (ref 1–2)
ALP LIVER SERPL-CCNC: 65 U/L
ALT SERPL-CCNC: 37 U/L
ANION GAP SERPL CALC-SCNC: 2 MMOL/L (ref 0–18)
AST SERPL-CCNC: 21 U/L (ref 15–37)
BASOPHILS # BLD AUTO: 0.01 X10(3) UL (ref 0–0.2)
BASOPHILS NFR BLD AUTO: 0.1 %
BILIRUB SERPL-MCNC: 0.5 MG/DL (ref 0.1–2)
BUN BLD-MCNC: 19 MG/DL (ref 7–18)
CALCIUM BLD-MCNC: 10.3 MG/DL (ref 8.5–10.1)
CHLORIDE SERPL-SCNC: 110 MMOL/L (ref 98–112)
CHOLEST SERPL-MCNC: 167 MG/DL (ref ?–200)
CO2 SERPL-SCNC: 28 MMOL/L (ref 21–32)
COMPLEXED PSA SERPL-MCNC: 0.67 NG/ML (ref ?–4)
CREAT BLD-MCNC: 1.39 MG/DL
DEPRECATED HBV CORE AB SER IA-ACNC: 187.1 NG/ML
EOSINOPHIL # BLD AUTO: 0.07 X10(3) UL (ref 0–0.7)
EOSINOPHIL NFR BLD AUTO: 1 %
ERYTHROCYTE [DISTWIDTH] IN BLOOD BY AUTOMATED COUNT: 12.9 %
FASTING PATIENT LIPID ANSWER: YES
FASTING STATUS PATIENT QL REPORTED: YES
GFR SERPLBLD BASED ON 1.73 SQ M-ARVRAT: 56 ML/MIN/1.73M2 (ref 60–?)
GLOBULIN PLAS-MCNC: 3.5 G/DL (ref 2.8–4.4)
GLUCOSE BLD-MCNC: 97 MG/DL (ref 70–99)
HCT VFR BLD AUTO: 49.8 %
HCV AB SERPL QL IA: NONREACTIVE
HDLC SERPL-MCNC: 47 MG/DL (ref 40–59)
HGB BLD-MCNC: 15.7 G/DL
IMM GRANULOCYTES # BLD AUTO: 0.02 X10(3) UL (ref 0–1)
IMM GRANULOCYTES NFR BLD: 0.3 %
IRON SATN MFR SERPL: 31 %
IRON SERPL-MCNC: 108 UG/DL
LDLC SERPL CALC-MCNC: 100 MG/DL (ref ?–100)
LYMPHOCYTES # BLD AUTO: 2.35 X10(3) UL (ref 1–4)
LYMPHOCYTES NFR BLD AUTO: 32.2 %
MCH RBC QN AUTO: 31.9 PG (ref 26–34)
MCHC RBC AUTO-ENTMCNC: 31.5 G/DL (ref 31–37)
MCV RBC AUTO: 101.2 FL
MONOCYTES # BLD AUTO: 0.5 X10(3) UL (ref 0.1–1)
MONOCYTES NFR BLD AUTO: 6.8 %
NEUTROPHILS # BLD AUTO: 4.35 X10 (3) UL (ref 1.5–7.7)
NEUTROPHILS # BLD AUTO: 4.35 X10(3) UL (ref 1.5–7.7)
NEUTROPHILS NFR BLD AUTO: 59.6 %
NONHDLC SERPL-MCNC: 120 MG/DL (ref ?–130)
OSMOLALITY SERPL CALC.SUM OF ELEC: 292 MOSM/KG (ref 275–295)
PLATELET # BLD AUTO: 195 10(3)UL (ref 150–450)
POTASSIUM SERPL-SCNC: 5.8 MMOL/L (ref 3.5–5.1)
PROT SERPL-MCNC: 7.8 G/DL (ref 6.4–8.2)
RBC # BLD AUTO: 4.92 X10(6)UL
SODIUM SERPL-SCNC: 140 MMOL/L (ref 136–145)
TIBC SERPL-MCNC: 346 UG/DL (ref 240–450)
TRANSFERRIN SERPL-MCNC: 232 MG/DL (ref 200–360)
TRIGL SERPL-MCNC: 113 MG/DL (ref 30–149)
TSI SER-ACNC: 2.04 MIU/ML (ref 0.36–3.74)
VLDLC SERPL CALC-MCNC: 19 MG/DL (ref 0–30)
WBC # BLD AUTO: 7.3 X10(3) UL (ref 4–11)

## 2022-08-17 PROCEDURE — 80061 LIPID PANEL: CPT

## 2022-08-17 PROCEDURE — 86803 HEPATITIS C AB TEST: CPT

## 2022-08-17 PROCEDURE — 83540 ASSAY OF IRON: CPT

## 2022-08-17 PROCEDURE — 82728 ASSAY OF FERRITIN: CPT

## 2022-08-17 PROCEDURE — 84443 ASSAY THYROID STIM HORMONE: CPT

## 2022-08-17 PROCEDURE — 85025 COMPLETE CBC W/AUTO DIFF WBC: CPT

## 2022-08-17 PROCEDURE — 80053 COMPREHEN METABOLIC PANEL: CPT

## 2022-08-17 PROCEDURE — 83550 IRON BINDING TEST: CPT

## 2022-08-17 PROCEDURE — 36415 COLL VENOUS BLD VENIPUNCTURE: CPT

## 2022-08-29 ENCOUNTER — TELEPHONE (OUTPATIENT)
Dept: FAMILY MEDICINE CLINIC | Facility: CLINIC | Age: 67
End: 2022-08-29

## 2022-09-02 ENCOUNTER — TELEPHONE (OUTPATIENT)
Dept: FAMILY MEDICINE CLINIC | Facility: CLINIC | Age: 67
End: 2022-09-02

## 2022-09-02 DIAGNOSIS — E87.5 HYPERKALEMIA: Primary | ICD-10-CM

## 2022-09-02 DIAGNOSIS — E83.52 HYPERCALCEMIA: Primary | ICD-10-CM

## 2022-09-02 DIAGNOSIS — E83.52 SERUM CALCIUM ELEVATED: ICD-10-CM

## 2022-09-02 DIAGNOSIS — E87.5 HYPERKALEMIA: ICD-10-CM

## 2022-09-02 NOTE — TELEPHONE ENCOUNTER
----- Message from Isidoro Candelaria MD sent at 9/2/2022 12:10 PM CDT -----  K+ is a little high, may be from hemolysis    Repeat CMP in 1-2 weeks    Ca also high, will check PTH with next blood draw    Called pt and left msg on vm with results/instructions.   Provider placed lab orders

## 2022-09-21 ENCOUNTER — HOSPITAL ENCOUNTER (OUTPATIENT)
Dept: ULTRASOUND IMAGING | Age: 67
Discharge: HOME | End: 2022-09-21
Attending: EMERGENCY MEDICINE

## 2022-09-21 DIAGNOSIS — I73.9 CLAUDICATION (HCC): ICD-10-CM

## 2022-09-21 DIAGNOSIS — M79.605 BILATERAL LEG PAIN: ICD-10-CM

## 2022-09-21 DIAGNOSIS — M79.604 BILATERAL LEG PAIN: ICD-10-CM

## 2022-09-21 PROCEDURE — 93925 LOWER EXTREMITY STUDY: CPT | Performed by: EMERGENCY MEDICINE

## 2022-09-26 ENCOUNTER — TELEPHONE (OUTPATIENT)
Dept: FAMILY MEDICINE CLINIC | Facility: CLINIC | Age: 67
End: 2022-09-26

## 2022-09-26 NOTE — TELEPHONE ENCOUNTER
Patients wife called. Patient would like someone to call him back and go over ultrasound results.  [phone]

## 2022-09-26 NOTE — TELEPHONE ENCOUNTER
----- Message from Olga Quevedo MD sent at 9/25/2022  9:36 PM CDT -----  US shows no evidence for stenosis  Follow up if with persistent Sx

## 2022-09-27 NOTE — TELEPHONE ENCOUNTER
Notified the patient's wife of the below US results and recommendation. Patient's wife verbalized understanding. Answered all questions at this time.

## 2022-11-29 ENCOUNTER — LAB ENCOUNTER (OUTPATIENT)
Dept: LAB | Age: 67
End: 2022-11-29
Attending: INTERNAL MEDICINE
Payer: MEDICARE

## 2022-11-29 DIAGNOSIS — Z01.812 PRE-OPERATIVE LABORATORY EXAMINATION: ICD-10-CM

## 2022-11-30 LAB — SARS-COV-2 RNA RESP QL NAA+PROBE: NOT DETECTED

## 2023-02-12 ENCOUNTER — LAB ENCOUNTER (OUTPATIENT)
Dept: LAB | Facility: HOSPITAL | Age: 68
End: 2023-02-12
Attending: INTERNAL MEDICINE
Payer: MEDICARE

## 2023-02-12 DIAGNOSIS — Z01.818 PRE-OP TESTING: ICD-10-CM

## 2023-02-13 LAB — SARS-COV-2 RNA RESP QL NAA+PROBE: NOT DETECTED

## 2023-02-15 PROBLEM — D12.3 BENIGN NEOPLASM OF TRANSVERSE COLON: Status: ACTIVE | Noted: 2023-02-15

## 2023-02-15 PROBLEM — K22.9 IRREGULAR Z LINE OF ESOPHAGUS: Status: ACTIVE | Noted: 2023-02-15

## 2023-02-15 PROBLEM — K52.9 CHRONIC COLITIS: Status: ACTIVE | Noted: 2023-02-15

## 2023-02-15 PROBLEM — K57.30 COLON, DIVERTICULOSIS: Status: ACTIVE | Noted: 2023-02-15

## 2023-02-15 PROBLEM — K44.9 HIATAL HERNIA: Status: ACTIVE | Noted: 2023-02-15

## 2023-02-15 PROBLEM — Z86.0100 PERSONAL HISTORY OF COLONIC POLYPS: Status: ACTIVE | Noted: 2023-02-15

## 2023-02-15 PROBLEM — Z86.010 PERSONAL HISTORY OF COLONIC POLYPS: Status: ACTIVE | Noted: 2023-02-15

## 2023-02-15 PROBLEM — K29.60 EROSIVE GASTRITIS: Status: ACTIVE | Noted: 2023-02-15

## 2023-02-15 PROBLEM — D12.0 BENIGN NEOPLASM OF CECUM: Status: ACTIVE | Noted: 2023-02-15

## 2023-02-15 PROBLEM — D12.2 BENIGN NEOPLASM OF ASCENDING COLON: Status: ACTIVE | Noted: 2023-02-15

## 2023-02-15 PROBLEM — K64.8 INTERNAL HEMORRHOIDS: Status: ACTIVE | Noted: 2018-05-03

## 2023-02-15 PROBLEM — K21.9 GERD (GASTROESOPHAGEAL REFLUX DISEASE): Status: ACTIVE | Noted: 2018-01-22

## 2023-02-15 PROBLEM — D12.4 BENIGN NEOPLASM OF DESCENDING COLON: Status: ACTIVE | Noted: 2023-02-15

## 2023-02-15 PROBLEM — K63.5 COLON POLYP: Status: ACTIVE | Noted: 2023-02-15

## 2023-06-30 ENCOUNTER — APPOINTMENT (OUTPATIENT)
Dept: GENERAL RADIOLOGY | Age: 68
End: 2023-06-30
Attending: EMERGENCY MEDICINE
Payer: MEDICARE

## 2023-06-30 ENCOUNTER — HOSPITAL ENCOUNTER (EMERGENCY)
Age: 68
Discharge: HOME | End: 2023-06-30
Attending: EMERGENCY MEDICINE
Payer: MEDICARE

## 2023-06-30 VITALS
WEIGHT: 200 LBS | BODY MASS INDEX: 28 KG/M2 | OXYGEN SATURATION: 97 % | RESPIRATION RATE: 16 BRPM | TEMPERATURE: 98 F | SYSTOLIC BLOOD PRESSURE: 109 MMHG | HEIGHT: 71 IN | HEART RATE: 54 BPM | DIASTOLIC BLOOD PRESSURE: 70 MMHG

## 2023-06-30 DIAGNOSIS — R00.2 PALPITATIONS: Primary | ICD-10-CM

## 2023-06-30 LAB
ALBUMIN SERPL-MCNC: 3.9 G/DL (ref 3.4–5)
ALBUMIN/GLOB SERPL: 1.1 {RATIO} (ref 1–2)
ALP LIVER SERPL-CCNC: 65 U/L
ALT SERPL-CCNC: 45 U/L
ANION GAP SERPL CALC-SCNC: 2 MMOL/L (ref 0–18)
APTT PPP: 27.6 SECONDS (ref 23.3–35.6)
AST SERPL-CCNC: 20 U/L (ref 15–37)
ATRIAL RATE: 57 BPM
BASOPHILS # BLD AUTO: 0.04 X10(3) UL (ref 0–0.2)
BASOPHILS NFR BLD AUTO: 0.5 %
BILIRUB SERPL-MCNC: 0.3 MG/DL (ref 0.1–2)
BUN BLD-MCNC: 21 MG/DL (ref 7–18)
CALCIUM BLD-MCNC: 9.2 MG/DL (ref 8.5–10.1)
CHLORIDE SERPL-SCNC: 111 MMOL/L (ref 98–112)
CO2 SERPL-SCNC: 28 MMOL/L (ref 21–32)
CREAT BLD-MCNC: 1.2 MG/DL
D DIMER PPP FEU-MCNC: <0.27 UG/ML FEU (ref ?–0.67)
EOSINOPHIL # BLD AUTO: 0.11 X10(3) UL (ref 0–0.7)
EOSINOPHIL NFR BLD AUTO: 1.4 %
ERYTHROCYTE [DISTWIDTH] IN BLOOD BY AUTOMATED COUNT: 12.4 %
GFR SERPLBLD BASED ON 1.73 SQ M-ARVRAT: 66 ML/MIN/1.73M2 (ref 60–?)
GLOBULIN PLAS-MCNC: 3.5 G/DL (ref 2.8–4.4)
GLUCOSE BLD-MCNC: 105 MG/DL (ref 70–99)
HCT VFR BLD AUTO: 45 %
HGB BLD-MCNC: 15.1 G/DL
IMM GRANULOCYTES # BLD AUTO: 0.02 X10(3) UL (ref 0–1)
IMM GRANULOCYTES NFR BLD: 0.3 %
INR BLD: 1 (ref 0.85–1.16)
LYMPHOCYTES # BLD AUTO: 2.77 X10(3) UL (ref 1–4)
LYMPHOCYTES NFR BLD AUTO: 35.2 %
MCH RBC QN AUTO: 31.7 PG (ref 26–34)
MCHC RBC AUTO-ENTMCNC: 33.6 G/DL (ref 31–37)
MCV RBC AUTO: 94.5 FL
MONOCYTES # BLD AUTO: 0.68 X10(3) UL (ref 0.1–1)
MONOCYTES NFR BLD AUTO: 8.6 %
NEUTROPHILS # BLD AUTO: 4.25 X10 (3) UL (ref 1.5–7.7)
NEUTROPHILS # BLD AUTO: 4.25 X10(3) UL (ref 1.5–7.7)
NEUTROPHILS NFR BLD AUTO: 54 %
OSMOLALITY SERPL CALC.SUM OF ELEC: 295 MOSM/KG (ref 275–295)
P AXIS: 50 DEGREES
P-R INTERVAL: 160 MS
PLATELET # BLD AUTO: 203 10(3)UL (ref 150–450)
POTASSIUM SERPL-SCNC: 4.1 MMOL/L (ref 3.5–5.1)
PROT SERPL-MCNC: 7.4 G/DL (ref 6.4–8.2)
PROTHROMBIN TIME: 13.2 SECONDS (ref 11.6–14.8)
Q-T INTERVAL: 434 MS
QRS DURATION: 96 MS
QTC CALCULATION (BEZET): 422 MS
R AXIS: 13 DEGREES
RBC # BLD AUTO: 4.76 X10(6)UL
SODIUM SERPL-SCNC: 141 MMOL/L (ref 136–145)
T AXIS: 46 DEGREES
TROPONIN I HIGH SENSITIVITY: 5 NG/L
TROPONIN I HIGH SENSITIVITY: 5 NG/L
VENTRICULAR RATE: 57 BPM
WBC # BLD AUTO: 7.9 X10(3) UL (ref 4–11)

## 2023-06-30 PROCEDURE — 93010 ELECTROCARDIOGRAM REPORT: CPT

## 2023-06-30 PROCEDURE — 96374 THER/PROPH/DIAG INJ IV PUSH: CPT

## 2023-06-30 PROCEDURE — 99285 EMERGENCY DEPT VISIT HI MDM: CPT

## 2023-06-30 PROCEDURE — 93005 ELECTROCARDIOGRAM TRACING: CPT

## 2023-06-30 PROCEDURE — 80053 COMPREHEN METABOLIC PANEL: CPT | Performed by: EMERGENCY MEDICINE

## 2023-06-30 PROCEDURE — 85379 FIBRIN DEGRADATION QUANT: CPT | Performed by: EMERGENCY MEDICINE

## 2023-06-30 PROCEDURE — 85025 COMPLETE CBC W/AUTO DIFF WBC: CPT | Performed by: EMERGENCY MEDICINE

## 2023-06-30 PROCEDURE — 84484 ASSAY OF TROPONIN QUANT: CPT | Performed by: EMERGENCY MEDICINE

## 2023-06-30 PROCEDURE — 85730 THROMBOPLASTIN TIME PARTIAL: CPT | Performed by: EMERGENCY MEDICINE

## 2023-06-30 PROCEDURE — 71045 X-RAY EXAM CHEST 1 VIEW: CPT | Performed by: EMERGENCY MEDICINE

## 2023-06-30 PROCEDURE — 85610 PROTHROMBIN TIME: CPT | Performed by: EMERGENCY MEDICINE

## 2023-06-30 RX ORDER — LORAZEPAM 2 MG/ML
0.5 INJECTION INTRAMUSCULAR ONCE
Status: COMPLETED | OUTPATIENT
Start: 2023-06-30 | End: 2023-06-30

## 2023-06-30 NOTE — PLAN OF CARE
[facility] consulted for chest pain. Patient follows with [facility]. ED physician notified and will consult [facility].

## 2023-06-30 NOTE — CM/SW NOTE
CM called [address] @ [phone] and spoke with Frida Hand who transferred CM to the [facility] location [phone]. CM spoke with Julieth Gutierrez who transferred CM to Dr. Aniya Oneill office [phone] and spoke with Timo. Per Timo, pt can see by NP on Monday July 3rd at 1:30pm. CM called pt and LVM to return CM call. Office address is [place] [address]. @[phone] pt wife returned call, advised of appointment on Monday, verbalized understanding.

## 2023-06-30 NOTE — ED INITIAL ASSESSMENT (HPI)
Patient to ER with c/o mid chest pain for the past couple of hours, patient states, \"my heart feels like I drank a pot of coffee\".

## 2023-06-30 NOTE — DISCHARGE INSTRUCTIONS
Call your cardiologist office today and follow-up with them in the next 24 to 48 hours. Return for new or worsening chest pain, difficulty breathing or any other concerning symptoms.

## 2023-07-03 ENCOUNTER — PATIENT OUTREACH (OUTPATIENT)
Dept: CASE MANAGEMENT | Age: 68
End: 2023-07-03

## 2023-07-05 NOTE — PROGRESS NOTES
2nd attempt ED f/up apt request    PCP -decline, pt wife stated she can make apts later  CARDS -decline, pt wife stated she can make apts later  Closing encounter

## 2023-10-12 ENCOUNTER — TELEPHONE (OUTPATIENT)
Dept: FAMILY MEDICINE CLINIC | Facility: CLINIC | Age: 68
End: 2023-10-12

## 2023-11-09 ENCOUNTER — TELEPHONE (OUTPATIENT)
Dept: FAMILY MEDICINE CLINIC | Facility: CLINIC | Age: 68
End: 2023-11-09

## 2023-12-11 ENCOUNTER — TELEPHONE (OUTPATIENT)
Dept: FAMILY MEDICINE CLINIC | Facility: CLINIC | Age: 68
End: 2023-12-11

## 2023-12-20 ENCOUNTER — TELEPHONE (OUTPATIENT)
Dept: FAMILY MEDICINE CLINIC | Facility: CLINIC | Age: 68
End: 2023-12-20

## 2023-12-20 RX ORDER — GABAPENTIN 600 MG/1
600 TABLET ORAL EVERY 8 HOURS PRN
COMMUNITY
Start: 2023-12-12

## 2023-12-20 NOTE — TELEPHONE ENCOUNTER
COMPREHENSIVE MEDICATION REVIEW         Delfina Aase MRN [id number]    10/16/1955 PCP Gardenia Mathur MD     Comments: Medication history completed by [address] Pharmacist with spouse, Jose Raul Corcoran (ok per HIPAA) over the phone on 23. Patient has upcoming AWV with PCP on 23. After thorough medication review, 6 discrepancies have been identified and corrected on patient's medication list. See updated list below:     Outpatient Encounter Medications as of 2023   Medication Sig    gabapentin 600 MG Oral Tab Take 1 tablet (600 mg total) by mouth every 8 (eight) hours as needed. pantoprazole 40 MG Oral Tab EC Take 1 tablet (40 mg total) by mouth daily. Pt states once a day before bed time    ROSUVASTATIN 20 MG Oral Tab TAKE ONE TABLET BY MOUTH EVERY NIGHT AT BEDTIME    HYDROcodone-acetaminophen  MG Oral Tab Take 1 tablet by mouth every 6 (six) hours as needed for Pain. 30 days     Medication Assessment:   Reviewed all medications in detail with spouse including dose, indication, timing of administration, monitoring parameters, and potential side effects of medications. Spouse reports patient is taking rosuvastatin 20 mg daily as prescribed. She tells me he is good about taking this one each day despite gaps in refill history. Spouse reports patient is no longer using Wellbutrin for smoking cessation. Did provide education and stressed the importance of taking medication just like prescribed to get the most benefit. Spouse denies any questions or concerns with medications at this time.      Thank you,    Yelena Bailey, PharmD, 2023, 9:58 AM

## 2023-12-21 ENCOUNTER — OFFICE VISIT (OUTPATIENT)
Dept: FAMILY MEDICINE CLINIC | Facility: CLINIC | Age: 68
End: 2023-12-21
Payer: MEDICARE

## 2023-12-21 VITALS
WEIGHT: 196 LBS | SYSTOLIC BLOOD PRESSURE: 130 MMHG | RESPIRATION RATE: 17 BRPM | DIASTOLIC BLOOD PRESSURE: 68 MMHG | HEIGHT: 71 IN | BODY MASS INDEX: 27.44 KG/M2 | OXYGEN SATURATION: 98 % | HEART RATE: 60 BPM

## 2023-12-21 DIAGNOSIS — Z00.00 ENCOUNTER FOR ANNUAL HEALTH EXAMINATION: Primary | ICD-10-CM

## 2023-12-21 DIAGNOSIS — Z87.891 PERSONAL HISTORY OF NICOTINE DEPENDENCE: ICD-10-CM

## 2023-12-21 DIAGNOSIS — F17.200 TOBACCO USE DISORDER: ICD-10-CM

## 2023-12-21 DIAGNOSIS — Z12.2 ENCOUNTER FOR SCREENING FOR LUNG CANCER: ICD-10-CM

## 2023-12-21 DIAGNOSIS — K21.9 CHRONIC GERD: ICD-10-CM

## 2023-12-21 DIAGNOSIS — E78.00 HYPERCHOLESTEROLEMIA: ICD-10-CM

## 2023-12-21 DIAGNOSIS — I71.20 THORACIC AORTIC ANEURYSM WITHOUT RUPTURE, UNSPECIFIED PART (HCC): ICD-10-CM

## 2023-12-21 DIAGNOSIS — I73.9 CLAUDICATION (HCC): ICD-10-CM

## 2023-12-21 PROCEDURE — G0439 PPPS, SUBSEQ VISIT: HCPCS | Performed by: EMERGENCY MEDICINE

## 2023-12-21 PROCEDURE — 1125F AMNT PAIN NOTED PAIN PRSNT: CPT | Performed by: EMERGENCY MEDICINE

## 2023-12-21 PROCEDURE — 99406 BEHAV CHNG SMOKING 3-10 MIN: CPT | Performed by: EMERGENCY MEDICINE

## 2023-12-21 RX ORDER — BUPROPION HYDROCHLORIDE 150 MG/1
150 TABLET, EXTENDED RELEASE ORAL 2 TIMES DAILY
Qty: 60 TABLET | Refills: 5 | Status: SHIPPED | OUTPATIENT
Start: 2023-12-21

## 2023-12-21 NOTE — PATIENT INSTRUCTIONS
Thank you for choosing [facility]  To Do:  [facility] with all medications  Start Wellbutrin for smoking cessation  Have blood tests done  Foloow up in 6 months for BP recheck  Monitor BP 1-2 x a week  Low fat low cholesterol diet  Arrange for CT scan for lung cancer screening                                  Thalia Rubio's SCREENING SCHEDULE   Tests on this list are recommended by your physician but may not be covered, or covered at this frequency, by your insurer. Please check with your insurance carrier before scheduling to verify coverage.    PREVENTATIVE SERVICES FREQUENCY &  COVERAGE DETAILS LAST COMPLETION DATE   Diabetes Screening    Fasting Blood Sugar / Glucose    One screening every 12 months if never tested or if previously tested but not diagnosed with pre-diabetes   One screening every 6 months if diagnosed with pre-diabetes Lab Results   Component Value Date    GLUCOSE 100 (H) 02/04/2016     (H) 06/30/2023        Cardiovascular Disease Screening    Lipid Panel  Cholesterol  Lipoprotein (HDL)  Triglycerides Covered every 5 years for all Medicare beneficiaries without apparent signs or symptoms of cardiovascular disease Lab Results   Component Value Date    CHOLEST 167 08/17/2022    HDL 47 08/17/2022     (H) 08/17/2022    TRIG 113 08/17/2022         Electrocardiogram (EKG)   Covered if needed at Welcome to Medicare, and non-screening if indicated for medical reasons 06/30/2023      Ultrasound Screening for Abdominal Aortic Aneurysm (AAA) Covered once in a lifetime for one of the following risk factors    Men who are 73-68 years old and have ever smoked    Anyone with a family history -     Colorectal Cancer Screening  Covered for ages 52-80; only need ONE of the following:    Colonoscopy   Covered every 10 years    Covered every 2 years if patient is at high risk or previous colonoscopy was abnormal 02/15/2023    Health Maintenance   Topic Date Due Colorectal Cancer Screening  02/15/2024       Flexible Sigmoidoscopy   Covered every 4 years -    Fecal Occult Blood Test Covered annually -   Prostate Cancer Screening    Prostate-Specific Antigen (PSA) Annually Lab Results   Component Value Date    PSA 1.97 02/11/2019     Health Maintenance   Topic Date Due    PSA  08/17/2024      Immunizations    Influenza Covered once per flu season  Please get every year -  Influenza Vaccine(1) Never done    Pneumococcal Each vaccine ([id number] & [id number]) covered once after 65 Prevnar 13: 12/21/2020    Mysbfeqto16: 02/23/2022     No recommendations at this time    Hepatitis B One screening covered for patients with certain risk factors   -  No recommendations at this time    Tetanus Toxoid Not covered by Medicare Part B unless medically necessary (cut with metal); may be covered with your pharmacy prescription benefits -    Tetanus, Diptheria and Pertusis TD and TDaP Not covered by Medicare Part B -  No recommendations at this time    Zoster Not covered by Medicare Part B; may be covered with your pharmacy  prescription benefits -  Zoster Vaccines(1 of 2) Never done     Annual Monitoring of Persistent Medications (ACE/ARB, digoxin diuretics, anticonvulsants)    Potassium Annually Lab Results   Component Value Date    K 4.1 06/30/2023         Creatinine   Annually Lab Results   Component Value Date    CREATSERUM 1.20 06/30/2023         BUN Annually Lab Results   Component Value Date    BUN 21 (H) 06/30/2023       Drug Serum Conc Annually No results found for: \"DIGOXIN\", \"DIG\", \"VALP\"

## 2023-12-27 ENCOUNTER — LAB ENCOUNTER (OUTPATIENT)
Dept: LAB | Age: 68
End: 2023-12-27
Attending: EMERGENCY MEDICINE
Payer: MEDICARE

## 2023-12-27 DIAGNOSIS — E78.00 HYPERCHOLESTEROLEMIA: ICD-10-CM

## 2023-12-27 DIAGNOSIS — Z00.00 ENCOUNTER FOR ANNUAL HEALTH EXAMINATION: ICD-10-CM

## 2023-12-27 LAB
ALBUMIN SERPL-MCNC: 4.3 G/DL (ref 3.4–5)
ALBUMIN/GLOB SERPL: 1.3 {RATIO} (ref 1–2)
ALP LIVER SERPL-CCNC: 68 U/L
ALT SERPL-CCNC: 30 U/L
ANION GAP SERPL CALC-SCNC: 2 MMOL/L (ref 0–18)
AST SERPL-CCNC: 21 U/L (ref 15–37)
BASOPHILS # BLD AUTO: 0.04 X10(3) UL (ref 0–0.2)
BASOPHILS NFR BLD AUTO: 0.5 %
BILIRUB SERPL-MCNC: 0.5 MG/DL (ref 0.1–2)
BUN BLD-MCNC: 16 MG/DL (ref 9–23)
CALCIUM BLD-MCNC: 9.9 MG/DL (ref 8.5–10.1)
CHLORIDE SERPL-SCNC: 110 MMOL/L (ref 98–112)
CHOLEST SERPL-MCNC: 162 MG/DL (ref ?–200)
CO2 SERPL-SCNC: 27 MMOL/L (ref 21–32)
CREAT BLD-MCNC: 1.62 MG/DL
EGFRCR SERPLBLD CKD-EPI 2021: 46 ML/MIN/1.73M2 (ref 60–?)
EOSINOPHIL # BLD AUTO: 0.05 X10(3) UL (ref 0–0.7)
EOSINOPHIL NFR BLD AUTO: 0.7 %
ERYTHROCYTE [DISTWIDTH] IN BLOOD BY AUTOMATED COUNT: 12.6 %
FASTING PATIENT LIPID ANSWER: YES
FASTING STATUS PATIENT QL REPORTED: YES
GLOBULIN PLAS-MCNC: 3.3 G/DL (ref 2.8–4.4)
GLUCOSE BLD-MCNC: 106 MG/DL (ref 70–99)
HCT VFR BLD AUTO: 47.8 %
HDLC SERPL-MCNC: 44 MG/DL (ref 40–59)
HGB BLD-MCNC: 15.7 G/DL
IMM GRANULOCYTES # BLD AUTO: 0.01 X10(3) UL (ref 0–1)
IMM GRANULOCYTES NFR BLD: 0.1 %
LDLC SERPL CALC-MCNC: 96 MG/DL (ref ?–100)
LYMPHOCYTES # BLD AUTO: 1.69 X10(3) UL (ref 1–4)
LYMPHOCYTES NFR BLD AUTO: 22.6 %
MCH RBC QN AUTO: 32 PG (ref 26–34)
MCHC RBC AUTO-ENTMCNC: 32.8 G/DL (ref 31–37)
MCV RBC AUTO: 97.6 FL
MONOCYTES # BLD AUTO: 0.51 X10(3) UL (ref 0.1–1)
MONOCYTES NFR BLD AUTO: 6.8 %
NEUTROPHILS # BLD AUTO: 5.17 X10 (3) UL (ref 1.5–7.7)
NEUTROPHILS # BLD AUTO: 5.17 X10(3) UL (ref 1.5–7.7)
NEUTROPHILS NFR BLD AUTO: 69.3 %
NONHDLC SERPL-MCNC: 118 MG/DL (ref ?–130)
OSMOLALITY SERPL CALC.SUM OF ELEC: 290 MOSM/KG (ref 275–295)
PLATELET # BLD AUTO: 214 10(3)UL (ref 150–450)
POTASSIUM SERPL-SCNC: 4.7 MMOL/L (ref 3.5–5.1)
PROT SERPL-MCNC: 7.6 G/DL (ref 6.4–8.2)
RBC # BLD AUTO: 4.9 X10(6)UL
SODIUM SERPL-SCNC: 139 MMOL/L (ref 136–145)
TRIGL SERPL-MCNC: 123 MG/DL (ref 30–149)
TSI SER-ACNC: 1.33 MIU/ML (ref 0.36–3.74)
VLDLC SERPL CALC-MCNC: 20 MG/DL (ref 0–30)
WBC # BLD AUTO: 7.5 X10(3) UL (ref 4–11)

## 2023-12-27 PROCEDURE — 80061 LIPID PANEL: CPT

## 2023-12-27 PROCEDURE — 85025 COMPLETE CBC W/AUTO DIFF WBC: CPT

## 2023-12-27 PROCEDURE — 36415 COLL VENOUS BLD VENIPUNCTURE: CPT

## 2023-12-27 PROCEDURE — 80053 COMPREHEN METABOLIC PANEL: CPT

## 2023-12-27 PROCEDURE — 84443 ASSAY THYROID STIM HORMONE: CPT

## 2023-12-29 ENCOUNTER — TELEPHONE (OUTPATIENT)
Dept: FAMILY MEDICINE CLINIC | Facility: CLINIC | Age: 68
End: 2023-12-29

## 2023-12-29 DIAGNOSIS — N28.9 ABNORMAL KIDNEY FUNCTION: Primary | ICD-10-CM

## 2024-01-02 ENCOUNTER — HOSPITAL ENCOUNTER (OUTPATIENT)
Dept: CT IMAGING | Age: 69
Discharge: HOME | End: 2024-01-02
Attending: EMERGENCY MEDICINE
Payer: MEDICARE

## 2024-01-02 DIAGNOSIS — Z12.2 ENCOUNTER FOR SCREENING FOR LUNG CANCER: ICD-10-CM

## 2024-01-02 DIAGNOSIS — F17.200 TOBACCO USE DISORDER: ICD-10-CM

## 2024-01-02 DIAGNOSIS — Z87.891 PERSONAL HISTORY OF NICOTINE DEPENDENCE: ICD-10-CM

## 2024-01-02 PROCEDURE — 71271 CT THORAX LUNG CANCER SCR C-: CPT | Performed by: EMERGENCY MEDICINE

## 2024-09-30 ENCOUNTER — APPOINTMENT (OUTPATIENT)
Dept: URBAN - METROPOLITAN AREA CLINIC 247 | Age: 69
Setting detail: DERMATOLOGY
End: 2024-09-30

## 2024-09-30 DIAGNOSIS — L73.8 OTHER SPECIFIED FOLLICULAR DISORDERS: ICD-10-CM

## 2024-09-30 DIAGNOSIS — L57.0 ACTINIC KERATOSIS: ICD-10-CM

## 2024-09-30 DIAGNOSIS — D22 MELANOCYTIC NEVI: ICD-10-CM

## 2024-09-30 DIAGNOSIS — D18.0 HEMANGIOMA: ICD-10-CM

## 2024-09-30 DIAGNOSIS — L57.8 OTHER SKIN CHANGES DUE TO CHRONIC EXPOSURE TO NONIONIZING RADIATION: ICD-10-CM

## 2024-09-30 DIAGNOSIS — L91.8 OTHER HYPERTROPHIC DISORDERS OF THE SKIN: ICD-10-CM

## 2024-09-30 DIAGNOSIS — L82.1 OTHER SEBORRHEIC KERATOSIS: ICD-10-CM

## 2024-09-30 PROBLEM — D18.01 HEMANGIOMA OF SKIN AND SUBCUTANEOUS TISSUE: Status: ACTIVE | Noted: 2024-09-30

## 2024-09-30 PROBLEM — D22.4 MELANOCYTIC NEVI OF SCALP AND NECK: Status: ACTIVE | Noted: 2024-09-30

## 2024-09-30 PROBLEM — D22.5 MELANOCYTIC NEVI OF TRUNK: Status: ACTIVE | Noted: 2024-09-30

## 2024-09-30 PROCEDURE — OTHER LIQUID NITROGEN: OTHER

## 2024-09-30 PROCEDURE — 17000 DESTRUCT PREMALG LESION: CPT

## 2024-09-30 PROCEDURE — OTHER MIPS QUALITY: OTHER

## 2024-09-30 PROCEDURE — OTHER COUNSELING: OTHER

## 2024-09-30 PROCEDURE — 17003 DESTRUCT PREMALG LES 2-14: CPT

## 2024-09-30 PROCEDURE — 99203 OFFICE O/P NEW LOW 30 MIN: CPT | Mod: 25

## 2024-09-30 ASSESSMENT — LOCATION DETAILED DESCRIPTION DERM
LOCATION DETAILED: RIGHT SUPERIOR LATERAL MIDBACK
LOCATION DETAILED: RIGHT INFERIOR ANTERIOR NECK
LOCATION DETAILED: RIGHT FOREHEAD
LOCATION DETAILED: LEFT CLAVICULAR NECK
LOCATION DETAILED: LEFT SUPERIOR MEDIAL LOWER BACK
LOCATION DETAILED: LEFT INFERIOR CENTRAL MALAR CHEEK
LOCATION DETAILED: RIGHT LATERAL ABDOMEN
LOCATION DETAILED: LEFT SUPERIOR FOREHEAD
LOCATION DETAILED: LEFT CENTRAL TEMPLE
LOCATION DETAILED: LEFT SUPERIOR LATERAL MALAR CHEEK
LOCATION DETAILED: RIGHT MID-UPPER BACK

## 2024-09-30 ASSESSMENT — LOCATION ZONE DERM
LOCATION ZONE: NECK
LOCATION ZONE: TRUNK
LOCATION ZONE: FACE

## 2024-09-30 ASSESSMENT — LOCATION SIMPLE DESCRIPTION DERM
LOCATION SIMPLE: LEFT TEMPLE
LOCATION SIMPLE: ABDOMEN
LOCATION SIMPLE: LEFT FOREHEAD
LOCATION SIMPLE: LEFT LOWER BACK
LOCATION SIMPLE: LEFT ANTERIOR NECK
LOCATION SIMPLE: RIGHT LOWER BACK
LOCATION SIMPLE: RIGHT ANTERIOR NECK
LOCATION SIMPLE: LEFT CHEEK
LOCATION SIMPLE: RIGHT UPPER BACK
LOCATION SIMPLE: RIGHT FOREHEAD

## 2024-10-29 ENCOUNTER — MED REC SCAN ONLY (OUTPATIENT)
Dept: FAMILY MEDICINE CLINIC | Facility: CLINIC | Age: 69
End: 2024-10-29

## 2024-11-08 ENCOUNTER — TELEPHONE (OUTPATIENT)
Dept: FAMILY MEDICINE CLINIC | Facility: CLINIC | Age: 69
End: 2024-11-08

## 2025-01-16 ENCOUNTER — TELEPHONE (OUTPATIENT)
Dept: FAMILY MEDICINE CLINIC | Facility: CLINIC | Age: 70
End: 2025-01-16

## 2025-03-28 ENCOUNTER — TELEPHONE (OUTPATIENT)
Dept: FAMILY MEDICINE CLINIC | Facility: CLINIC | Age: 70
End: 2025-03-28

## 2025-03-28 NOTE — TELEPHONE ENCOUNTER
Patients wife called stating that patient would like to switch to a male doctor. Would like to switch to Dr Garcia.     Is this ok? And can he be booked sooner than July when you are taking new patients?

## 2025-03-31 NOTE — TELEPHONE ENCOUNTER
LVM requesting call back to help with NP appt.     Ok per Dr. Garcia to have sooner appt, when pt calls back please make it a 40 min NP appt.

## 2025-06-03 ENCOUNTER — TELEPHONE (OUTPATIENT)
Dept: FAMILY MEDICINE CLINIC | Facility: CLINIC | Age: 70
End: 2025-06-03

## (undated) NOTE — ED AVS SNAPSHOT
THE [facility] Emergency Department in [address] [facility]    Phone:  [phone]    Fax:  [phone]    MRN: [id number]    Department:  THE [facility] Emergency Department in [place]   Date of Visit: Ibuprofen for pain  Norco as needed for pain as previously prescribed by her doctor. Recheck in 2 days, sooner if not improving. You may follow-up with our ear nose and throat doctor on call, Dr. Gerardo Mckeon or your primary care physician.     Discharge Reference [facility] Emergency Department. Follow-up care is at the discretion of that Physician. IF THERE IS ANY CHANGE OR WORSENING OF YOUR CONDITION, CALL YOUR PRIMARY CARE PHYSICIAN AT ONCE OR RETURN IMMEDIATELY TO THE EMERGENCY DEPARTMENT.     If you hav [phone]. - If you don’t have insurance, [facility] has partnered with [facility] [address] to help you get signed up for insurance coverage.   Patient [employer] is a Federal Navigator program that can help with your Affordab

## (undated) NOTE — LETTER
December 28, 2020    [facility]  [address] [facility] [id number]      Dear Taiwo Flores: The following are the results of your recent tests. Please review the list of test results.   Your result is the value on the left; we have also supplied the rang

## (undated) NOTE — ED AVS SNAPSHOT
[facility] in [address] [facility]    Phone:  [phone]    Fax:  [phone]   MRN: [id number]    Department:  [facility] in [place]   Date of Visit: IF THERE IS ANY CHANGE OR WORSENING OF YOUR CONDITION, CALL YOUR PRIMARY CARE PHYSICIAN AT ONCE OR RETURN IMMEDIATELY TO THE EMERGENCY DEPARTMENT.     If you have been prescribed any medication(s), please fill your prescription right away and begin taking t